# Patient Record
Sex: FEMALE | Race: BLACK OR AFRICAN AMERICAN | NOT HISPANIC OR LATINO | Employment: OTHER | URBAN - METROPOLITAN AREA
[De-identification: names, ages, dates, MRNs, and addresses within clinical notes are randomized per-mention and may not be internally consistent; named-entity substitution may affect disease eponyms.]

---

## 2023-10-30 DIAGNOSIS — G35 MULTIPLE SCLEROSIS (MULTI): Primary | ICD-10-CM

## 2023-10-30 RX ORDER — FOLIC ACID 1 MG/1
1 TABLET ORAL DAILY
Qty: 30 TABLET | Refills: 11 | Status: SHIPPED | OUTPATIENT
Start: 2023-10-30

## 2024-03-15 ENCOUNTER — TELEPHONE (OUTPATIENT)
Dept: NEUROLOGY | Facility: CLINIC | Age: 59
End: 2024-03-15
Payer: COMMERCIAL

## 2024-05-08 ENCOUNTER — TELEMEDICINE (OUTPATIENT)
Dept: NEUROLOGY | Facility: CLINIC | Age: 59
End: 2024-05-08
Payer: COMMERCIAL

## 2024-05-08 DIAGNOSIS — G35 MULTIPLE SCLEROSIS (MULTI): Primary | ICD-10-CM

## 2024-05-08 DIAGNOSIS — R13.10 DYSPHAGIA, UNSPECIFIED TYPE: ICD-10-CM

## 2024-05-08 PROCEDURE — 99214 OFFICE O/P EST MOD 30 MIN: CPT | Performed by: PSYCHIATRY & NEUROLOGY

## 2024-05-08 RX ORDER — LEVOTHYROXINE SODIUM 75 UG/1
75 TABLET ORAL
COMMUNITY
Start: 2024-02-28

## 2024-05-08 RX ORDER — PROGESTERONE 200 MG/1
CAPSULE ORAL
COMMUNITY
Start: 2023-09-05

## 2024-05-08 RX ORDER — GUAIFENESIN 1200 MG/1
1200 TABLET, EXTENDED RELEASE ORAL 3 TIMES DAILY
COMMUNITY

## 2024-05-08 RX ORDER — PANTOPRAZOLE SODIUM 20 MG/1
20 TABLET, DELAYED RELEASE ORAL
COMMUNITY
Start: 2024-04-08

## 2024-05-08 RX ORDER — LIOTHYRONINE SODIUM 5 UG/1
5 TABLET ORAL
COMMUNITY
Start: 2023-06-19

## 2024-05-08 RX ORDER — CYCLOBENZAPRINE HCL 5 MG
5 TABLET ORAL EVERY 8 HOURS PRN
COMMUNITY
Start: 2024-04-17

## 2024-05-08 RX ORDER — ESTRADIOL 0.1 MG/D
1 FILM, EXTENDED RELEASE TRANSDERMAL 2 TIMES WEEKLY
COMMUNITY
Start: 2018-08-21

## 2024-05-08 RX ORDER — ALPHA LIPOIC ACID 100 MG
100 CAPSULE ORAL
COMMUNITY

## 2024-05-08 RX ORDER — FERROUS SULFATE 325(65) MG
1 TABLET ORAL
COMMUNITY
Start: 2024-04-09

## 2024-05-08 RX ORDER — ALBUTEROL SULFATE 0.83 MG/ML
2.5 SOLUTION RESPIRATORY (INHALATION) 2 TIMES DAILY
COMMUNITY
Start: 2023-12-11

## 2024-05-08 RX ORDER — HYDROCORTISONE 10 MG/1
20 TABLET ORAL 3 TIMES DAILY
COMMUNITY

## 2024-05-08 NOTE — PROGRESS NOTES
NEUROLOGY OUTPATIENT FOLLOW-UP NOTE    Assessment/Plan   Diagnoses and all orders for this visit:  Multiple sclerosis (Multi)  Dysphagia, unspecified type      IMPRESSION:  Advanced secondary progressive multiple sclerosis.  Dysphagia most likely referable to GERD.  I cannot absolutely rule out involvement of MS, however.    PLAN:  I advised her that the EGD would be helpful diagnostically, and if it doesn't reveal specific esophageal pathology, she may need cranial MRI wwo to rule out involvement of MS with the dysphagia.  In the meantime, she has just started speech/swallow therapy, and I encouraged her to follow.  I will see her again as needed.      Lewis Noonan Jr., M.D., FAAN   ----------    Virtual or Telephone Consent    An interactive audio and video telecommunication system which permits real time communications between the patient (at the originating site) and provider (at the distant site) was utilized to provide this telehealth service.   Verbal consent was requested and obtained from Roxy Rivas on this date, 05/08/24 for a telehealth visit.      Subjective     Roxy Rivas is a 59 y.o. year old female here for follow-up.    No change in her quadriparesis from multiple sclerosis.    Her primary concern today is dysphagia to solids and liquids, and sometimes to pills, for the last year.  She additionally has a prior history of hospitalization in 1986 for GERD.    She has had some worsening of the dysphagia in the last few months.  She is being worked up for this issue.  She had a modified barium swallow at [facility] in February (report reviewed).  This revealed mild-moderate pharyngeal stage dysphagia with laryngeal penetration to the vocal cords.  Based on this, she was recommended to start nectar thickened liquids.    She was also seen by GI at [facility] recently, and was recommended an EGD because of previously positive H. pylori.  However, she is concerned about whether an EGD is  safe for her.  She reports having been on antibiotics for this.    Of note is that she receives vocal cord injections, for which the positive effect lasts about six months.  The last one was 5/2/24.        No past medical history on file.  No past surgical history on file.  Social History     Tobacco Use    Smoking status: Not on file    Smokeless tobacco: Not on file   Substance Use Topics    Alcohol use: Not on file     family history is not on file.    Current Outpatient Medications:     folic acid (Folvite) 1 mg tablet, TAKE 1 TABLET BY MOUTH EVERY DAY AS DIRECTED, Disp: 30 tablet, Rfl: 11  Not on File    Objective     There were no vitals taken for this visit.    CONSTITUTIONAL:  No acute distress    MENTAL STATUS:  Awake, alert, fully oriented to self, place, and time, with present short-term memory, good awareness of recent events, normal attention span, concentration, and fund of knowledge.    SPEECH AND LANGUAGE:  Can name and repeat, follows all commands, has no dysarthria, is mildly hoarse.    FUNDOSCOPIC:  Deferred because of virtual visit    CRANIAL NERVES:  II-Vision grossly present, visual fields deferred because of virtual visit    III/IV/VI--EOMs are present in all directions.  No ptosis.  Pupillary response deferred because of virtual visit    V--Normal jaw movement.  Sensation deferred because of virtual visit    VII--No facial asymmetry.    VIII--Hearing grossly present given ability to hear me on the call.    IX/X--Symmetric soft palate rise.    XI--Antigravity, symmetrical trapezius power bilaterally.    XII--Tongue protrudes without deviation.    MOTOR:  No movement of either arm or either leg.    SENSORY:  Deferred because of virtual visit    COORDINATION:  Unable because of quadriparesis.    REFLEXES Deferred because of virtual visit    GAIT Deferred because of virtual visit        Lewis Noonan Jr., M.D., FAAN

## 2024-05-08 NOTE — LETTER
May 8, 2024     Cady Fuentes MD  [id number]  [facility] [address]    Patient: Roxy Rivas   YOB: 1965   Date of Visit: 5/8/2024       Dear Dr. Cady Fuentes MD:    Thank you for allowing me to see your patient, Roxy Rivas for neurological follow-up evaluation. Below are my notes for this visit.  If you have questions, please do not hesitate to call me.       Sincerely,     Lewis Noonan Jr., M.D., FAAN     ______________________________________________________________________________________    NEUROLOGY OUTPATIENT FOLLOW-UP NOTE    Assessment/Plan  Diagnoses and all orders for this visit:  Multiple sclerosis (Multi)  Dysphagia, unspecified type      IMPRESSION:  Advanced secondary progressive multiple sclerosis.  Dysphagia most likely referable to GERD.  I cannot absolutely rule out involvement of MS, however.    PLAN:  I advised her that the EGD would be helpful diagnostically, and if it doesn't reveal specific esophageal pathology, she may need cranial MRI wwo to rule out involvement of MS with the dysphagia.  In the meantime, she has just started speech/swallow therapy, and I encouraged her to follow.  I will see her again as needed.      Lewis Noonan Jr., M.D., FAAN   ----------    Virtual or Telephone Consent    An interactive audio and video telecommunication system which permits real time communications between the patient (at the originating site) and provider (at the distant site) was utilized to provide this telehealth service.   Verbal consent was requested and obtained from Roxy Rivas on this date, 05/08/24 for a telehealth visit.      Subjective    Roxy Rivas is a 59 y.o. year old female here for follow-up.    No change in her quadriparesis from multiple sclerosis.    Her primary concern today is dysphagia to solids and liquids, and sometimes to pills, for the last year.  She additionally has a prior history of hospitalization in 1986  for GERD.    She has had some worsening of the dysphagia in the last few months.  She is being worked up for this issue.  She had a modified barium swallow at [facility] in February (report reviewed).  This revealed mild-moderate pharyngeal stage dysphagia with laryngeal penetration to the vocal cords.  Based on this, she was recommended to start nectar thickened liquids.    She was also seen by GI at [facility] recently, and was recommended an EGD because of previously positive H. pylori.  However, she is concerned about whether an EGD is safe for her.  She reports having been on antibiotics for this.    Of note is that she receives vocal cord injections, for which the positive effect lasts about six months.  The last one was 5/2/24.        No past medical history on file.  No past surgical history on file.  Social History     Tobacco Use   • Smoking status: Not on file   • Smokeless tobacco: Not on file   Substance Use Topics   • Alcohol use: Not on file     family history is not on file.    Current Outpatient Medications:   •  folic acid (Folvite) 1 mg tablet, TAKE 1 TABLET BY MOUTH EVERY DAY AS DIRECTED, Disp: 30 tablet, Rfl: 11  Not on File    Objective    There were no vitals taken for this visit.    CONSTITUTIONAL:  No acute distress    MENTAL STATUS:  Awake, alert, fully oriented to self, place, and time, with present short-term memory, good awareness of recent events, normal attention span, concentration, and fund of knowledge.    SPEECH AND LANGUAGE:  Can name and repeat, follows all commands, has no dysarthria, is mildly hoarse.    FUNDOSCOPIC:  Deferred because of virtual visit    CRANIAL NERVES:  II-Vision grossly present, visual fields deferred because of virtual visit    III/IV/VI--EOMs are present in all directions.  No ptosis.  Pupillary response deferred because of virtual visit    V--Normal jaw movement.  Sensation deferred because of virtual visit    VII--No facial asymmetry.    VIII--Hearing grossly  present given ability to hear me on the call.    IX/X--Symmetric soft palate rise.    XI--Antigravity, symmetrical trapezius power bilaterally.    XII--Tongue protrudes without deviation.    MOTOR:  No movement of either arm or either leg.    SENSORY:  Deferred because of virtual visit    COORDINATION:  Unable because of quadriparesis.    REFLEXES Deferred because of virtual visit    GAIT Deferred because of virtual visit        Lewis Noonan Jr., M.D., FAAN

## 2024-05-31 ENCOUNTER — TELEPHONE (OUTPATIENT)
Dept: NEUROLOGY | Facility: CLINIC | Age: 59
End: 2024-05-31
Payer: COMMERCIAL

## 2024-05-31 DIAGNOSIS — G35 MULTIPLE SCLEROSIS (MULTI): Primary | ICD-10-CM

## 2024-07-02 DIAGNOSIS — R20.9 SENSORY DISTURBANCE: Primary | ICD-10-CM

## 2024-07-08 RX ORDER — NORTRIPTYLINE HYDROCHLORIDE 25 MG/1
25 CAPSULE ORAL NIGHTLY
Qty: 30 CAPSULE | Refills: 11 | Status: SHIPPED | OUTPATIENT
Start: 2024-07-08

## 2024-07-22 DIAGNOSIS — G35 MULTIPLE SCLEROSIS (MULTI): ICD-10-CM

## 2024-08-02 RX ORDER — FOLIC ACID 1 MG/1
1 TABLET ORAL DAILY
Qty: 30 TABLET | Refills: 11 | Status: SHIPPED | OUTPATIENT
Start: 2024-08-02

## 2024-12-02 DIAGNOSIS — G35 MULTIPLE SCLEROSIS (MULTI): ICD-10-CM

## 2024-12-03 RX ORDER — FOLIC ACID 1 MG/1
1 TABLET ORAL DAILY
Qty: 30 TABLET | Refills: 11 | Status: SHIPPED | OUTPATIENT
Start: 2024-12-03